# Patient Record
Sex: MALE | Race: WHITE | NOT HISPANIC OR LATINO | URBAN - METROPOLITAN AREA
[De-identification: names, ages, dates, MRNs, and addresses within clinical notes are randomized per-mention and may not be internally consistent; named-entity substitution may affect disease eponyms.]

---

## 2020-10-05 ENCOUNTER — IMPORTED ENCOUNTER (OUTPATIENT)
Dept: URBAN - METROPOLITAN AREA CLINIC 50 | Facility: CLINIC | Age: 65
End: 2020-10-05

## 2020-10-15 ENCOUNTER — IMPORTED ENCOUNTER (OUTPATIENT)
Dept: URBAN - METROPOLITAN AREA CLINIC 50 | Facility: CLINIC | Age: 65
End: 2020-10-15

## 2021-04-17 ASSESSMENT — TONOMETRY
OS_IOP_MMHG: 16
OD_IOP_MMHG: 16

## 2021-04-17 ASSESSMENT — VISUAL ACUITY
OD_CC: 20/20
OS_OTHER: 20/25.
OD_OTHER: 20/25.
OS_CC: J1+
OD_CC: J1+
OS_CC: 20/20'BLURRY'

## 2021-10-08 ENCOUNTER — PREPPED CHART (OUTPATIENT)
Dept: URBAN - METROPOLITAN AREA CLINIC 52 | Facility: CLINIC | Age: 66
End: 2021-10-08

## 2022-12-08 ENCOUNTER — NEW PATIENT (OUTPATIENT)
Dept: URBAN - METROPOLITAN AREA CLINIC 52 | Facility: CLINIC | Age: 67
End: 2022-12-08

## 2022-12-08 PROCEDURE — 6876150 PUNCTUM PLUG, BILATERAL

## 2022-12-08 PROCEDURE — 92134 CPTRZ OPH DX IMG PST SGM RTA: CPT

## 2022-12-08 PROCEDURE — 92014 COMPRE OPH EXAM EST PT 1/>: CPT

## 2022-12-08 ASSESSMENT — VISUAL ACUITY
OS_GLARE: 20/25
OU_SC: 20/100
OS_SC: 20/100
OD_GLARE: 20/25
OD_GLARE: 20/40
OU_CC: J1+@16"
OD_SC: 20/400
OD_CC: 20/20-1
OS_CC: 20/20
OU_CC: 20/20
OS_GLARE: 20/40

## 2022-12-08 ASSESSMENT — TONOMETRY
OS_IOP_MMHG: 17
OD_IOP_MMHG: 19

## 2022-12-08 NOTE — PROCEDURE NOTE: CLINICAL
PROCEDURE NOTE: Punctal Plugs Left Lower Lid. Informed consent was obtained. OS LL 0.7mm of plugs inserted. Patient tolerated procedure without complication or difficulty. Nelson Diallo PROCEDURE NOTE: Punctal Plugs Right Upper Lid. Informed consent was obtained. Size/location of plugs inserted: *. Patient tolerated procedure without complication or difficulty. Nelson Diallo

## 2023-12-14 ENCOUNTER — COMPREHENSIVE EXAM (OUTPATIENT)
Dept: URBAN - METROPOLITAN AREA CLINIC 52 | Facility: CLINIC | Age: 68
End: 2023-12-14

## 2023-12-14 DIAGNOSIS — H25.813: ICD-10-CM

## 2023-12-14 DIAGNOSIS — H43.813: ICD-10-CM

## 2023-12-14 PROCEDURE — 92015 DETERMINE REFRACTIVE STATE: CPT

## 2023-12-14 PROCEDURE — 92014 COMPRE OPH EXAM EST PT 1/>: CPT

## 2023-12-14 ASSESSMENT — VISUAL ACUITY
OU_CC: J1+
OD_GLARE: 20/20
OD_GLARE: 20/20
OD_CC: 20/20
OS_GLARE: 20/20
OS_GLARE: 20/20
OS_CC: 20/20
OU_CC: 20/20

## 2023-12-14 ASSESSMENT — TONOMETRY
OS_IOP_MMHG: 14
OD_IOP_MMHG: 14

## 2024-12-12 ENCOUNTER — CONSULTATION/EVALUATION (OUTPATIENT)
Age: 69
End: 2024-12-12

## 2024-12-12 DIAGNOSIS — H04.123: ICD-10-CM

## 2024-12-12 DIAGNOSIS — H25.811: ICD-10-CM

## 2024-12-12 DIAGNOSIS — H43.813: ICD-10-CM

## 2024-12-12 DIAGNOSIS — H25.12: ICD-10-CM

## 2024-12-12 PROCEDURE — 92014 COMPRE OPH EXAM EST PT 1/>: CPT

## 2024-12-12 RX ORDER — POLYETHYLENE GLYCOL 400 2.5 MG/ML: 1 SOLUTION/ DROPS OPHTHALMIC

## 2024-12-12 RX ORDER — PERFLUOROHEXYLOCTANE 1 MG/MG: 1 SOLUTION OPHTHALMIC
